# Patient Record
(demographics unavailable — no encounter records)

---

## 2025-03-05 NOTE — HISTORY OF PRESENT ILLNESS
[de-identified] : Patient Name: RJ JOSE MRN: [id number] Referring Provider: none Date: 03/12/2025  Diagnosis: Cholelithiasis  Operative Date: 2/17/25 Procedure: Laparoscopic cholecystectomy, real-time fluoroscopy, and intraoperative cholangiography Pathology: Cholelithiasis and chronic cholecystitis. 1 unremarkable regional lymph node  Mr. Jose is a 77 M who presents today for a post-op visit. He is status post a lap cholecystectomy and intra-op cholangiography Surgery Date: 2/17/2025 Post-Op Week # 3, Day #23  Pt ~~ pain, ~~ swelling, redness or abnormal drainage from incision site. Patient ~~ fever , chills. ~~ N/V/D, trouble with urination or moving bowels.

## 2025-03-19 NOTE — HISTORY OF PRESENT ILLNESS
[de-identified] : Patient Name: RJ JOSE MRN: [id number] Referring Provider: none Date: 03/19/2025  Diagnosis: Cholelithiasis  Operative Date: 2/17/25 Procedure: Laparoscopic cholecystectomy, real-time fluoroscopy, and intraoperative cholangiography Pathology: Cholelithiasis and chronic cholecystitis. 1 unremarkable regional lymph node  Mr. Jose is a 77 M who presents today for a post-op visit. He is status post a lap cholecystectomy and intra-op cholangiography Surgery Date: 2/17/2025 Post-Op Week # 4, Day #2  Currently, Mr. Jose denies pain, swelling, redness or abnormal drainage from incision site. Patient denies fever, chills, N/V/D, trouble with urination or moving bowels.

## 2025-03-19 NOTE — HISTORY OF PRESENT ILLNESS
[de-identified] : Patient Name: RJ JOSE MRN: [id number] Referring Provider: none Date: 03/19/2025  Diagnosis: Cholelithiasis  Operative Date: 2/17/25 Procedure: Laparoscopic cholecystectomy, real-time fluoroscopy, and intraoperative cholangiography Pathology: Cholelithiasis and chronic cholecystitis. 1 unremarkable regional lymph node  Mr. Jose is a 77 M who presents today for a post-op visit. He is status post a lap cholecystectomy and intra-op cholangiography Surgery Date: 2/17/2025 Post-Op Week # 4, Day #2  Currently, Mr. Jose denies pain, swelling, redness or abnormal drainage from incision site. Patient denies fever, chills, N/V/D, trouble with urination or moving bowels.

## 2025-03-19 NOTE — HISTORY OF PRESENT ILLNESS
[de-identified] : Patient Name: JR JOSE MRN: [id number] Referring Provider: none Date: 03/19/2025  Diagnosis: Cholelithiasis  Operative Date: 2/17/25 Procedure: Laparoscopic cholecystectomy, real-time fluoroscopy, and intraoperative cholangiography Pathology: Cholelithiasis and chronic cholecystitis. 1 unremarkable regional lymph node  Mr. Jose is a 77 M who presents today for a post-op visit. He is status post a lap cholecystectomy and intra-op cholangiography Surgery Date: 2/17/2025 Post-Op Week # 4, Day #2  Currently, Mr. Jose denies pain, swelling, redness or abnormal drainage from incision site. Patient denies fever, chills, N/V/D, trouble with urination or moving bowels.